# Patient Record
Sex: FEMALE | HISPANIC OR LATINO | URBAN - METROPOLITAN AREA
[De-identification: names, ages, dates, MRNs, and addresses within clinical notes are randomized per-mention and may not be internally consistent; named-entity substitution may affect disease eponyms.]

---

## 2019-04-17 NOTE — IMPRESSION/PLAN
Impression: Open angle with borderline findings, low risk, bilateral: H40.013. Plan: glaucoma suspect, no treatment at this time. 

RTC 2-4 months  glaucoma screen with medical optometrist

## 2019-04-17 NOTE — IMPRESSION/PLAN
Impression: Age-related nuclear cataract, bilateral: H25.13. Plan: The patient has a visually significant cataract in both eyes. After discussion with the patient and careful examination it has been determined that a cataract in both eyes is accounting for a significant amount of the patient's visual symptoms. Cataract surgery and the associated risks, benefits, alternatives, expectations, and recovery were discussed in detail with the patient. All questions were answered. The patient understands that there may be some limitation in visual potential given any pre-existing ocular disease. The patient desires to think about cataract surgery. 

If patient decides to have surgery OU RL 2
CE/IOL left eye then right eye, R/B/ A discussed

## 2019-04-17 NOTE — IMPRESSION/PLAN
Impression: Type 2 diabetes mellitus w/o complication: D06.5.  Plan: Advised to obtain good blood glucose control No diabetic retinopathy on today's exam

Recommend yearly diabetic exam with Corrinne Oliva or medial optometrist

## 2019-12-18 NOTE — IMPRESSION/PLAN
Impression: Other secondary cataract, bilateral: H26.493. Plan: Risks, benefits, alternatives, and expectations of YAG capsulotomy were discussed. The patient desires a YAG capsulotomy in both eyes. Schedule YAG in both eyes, right eye then left eye with 1-2 day post op.

## 2019-12-18 NOTE — IMPRESSION/PLAN
Impression: Open angle with borderline findings, low risk, bilateral: H40.013. Plan: No drop, continue to monitor RTC  4 months IOP check ( short exam)

## 2020-07-07 NOTE — IMPRESSION/PLAN
Impression: Other secondary cataract, bilateral: H26.493. Plan: Patient not symptomatic at this time.   

RTC 4-6 month YAG EVAL ( long exam) and DE

## 2021-01-05 NOTE — IMPRESSION/PLAN
Impression: Other secondary cataract, bilateral: H26.493. Plan: Patient would like to hold off on laser at this time RTC 1 year yag wale ( long exam)

## 2021-01-05 NOTE — IMPRESSION/PLAN
Impression: Type 2 diabetes mellitus w/o complication: L84.9. 
Patient may have early hypertensive disease Plan: No diabetic retinopathy on today's exam

Recommend yearly diabetic exam with Dr. Blaire De La Garza or Dr. Alvin France

## 2021-01-05 NOTE — IMPRESSION/PLAN
Impression: Bilateral central pterygium of eyes: H11.023. Plan: Discussed diagnosis in detail with patient. Advised patient of condition. No treatment is required at this time. Patient instructed to use artificial tears as needed. Will continue to observe condition and or symptoms. Patient to advices clinic of any changes prior to time of return.

## 2022-02-17 NOTE — IMPRESSION/PLAN
Impression: Other secondary cataract, right eye: H26.491. Plan: Patient happy w/current vision, will monitor with annual exams.

## 2022-02-17 NOTE — IMPRESSION/PLAN
Impression: Type 2 diabetes mellitus w/o complication: Y96.9. 
Patient may have early hypertensive disease Plan: No diabetic retinopathy on today's exam

Recommend yearly diabetic exam

## 2023-03-09 ENCOUNTER — OFFICE VISIT (OUTPATIENT)
Dept: URBAN - METROPOLITAN AREA CLINIC 48 | Facility: CLINIC | Age: 68
End: 2023-03-09
Payer: COMMERCIAL

## 2023-03-09 DIAGNOSIS — H26.491 OTHER SECONDARY CATARACT, RIGHT EYE: Primary | ICD-10-CM

## 2023-03-09 DIAGNOSIS — E11.3291 DIABETES MELLITUS TYPE 2 WITH MILD NON-PROLIFERATIVE RETINOPATHY WITHOUT MACULAR EDEMA, RIGHT EYE: ICD-10-CM

## 2023-03-09 PROCEDURE — 92014 COMPRE OPH EXAM EST PT 1/>: CPT | Performed by: OPHTHALMOLOGY

## 2023-03-09 ASSESSMENT — VISUAL ACUITY
OS: 20/30
OD: 20/30

## 2023-03-09 ASSESSMENT — INTRAOCULAR PRESSURE
OS: 18
OD: 15

## 2023-03-09 NOTE — IMPRESSION/PLAN
Impression: Other secondary cataract, right eye: H26.491. Plan: Not visually significant, monitor. Annual follow up.

## 2023-03-09 NOTE — IMPRESSION/PLAN
Impression: Diabetes mellitus Type 2 with mild non-proliferative retinopathy without macular edema, right eye: I64.1020. Plan: Diabetes type II: mild background diabetic retinopathy, no signs of neovascularization noted. No treatment necessary at this time. Patient was instructed to monitor vision for sudden changes and to call if visual changes noted. Discussed ocular and systemic benefits of blood sugar control. Annual dilated eye exam with Dr Michelle Mcnair.

## 2024-05-14 ENCOUNTER — OFFICE VISIT (OUTPATIENT)
Dept: URBAN - METROPOLITAN AREA CLINIC 48 | Facility: CLINIC | Age: 69
End: 2024-05-14
Payer: MEDICARE

## 2024-05-14 DIAGNOSIS — E11.9 TYPE 2 DIABETES MELLITUS W/O COMPLICATION: Primary | ICD-10-CM

## 2024-05-14 DIAGNOSIS — H43.813 VITREOUS DEGENERATION, BILATERAL: ICD-10-CM

## 2024-05-14 DIAGNOSIS — H11.013 AMYLOID PTERYGIUM OF EYE, BILATERAL: ICD-10-CM

## 2024-05-14 PROCEDURE — 99204 OFFICE O/P NEW MOD 45 MIN: CPT | Performed by: OPTOMETRIST

## 2024-05-14 PROCEDURE — 92134 CPTRZ OPH DX IMG PST SGM RTA: CPT | Performed by: OPTOMETRIST

## 2024-05-14 ASSESSMENT — INTRAOCULAR PRESSURE
OD: 14
OS: 14

## 2025-05-19 ENCOUNTER — OFFICE VISIT (OUTPATIENT)
Dept: URBAN - METROPOLITAN AREA CLINIC 48 | Facility: CLINIC | Age: 70
End: 2025-05-19
Payer: MEDICARE

## 2025-05-19 DIAGNOSIS — Z96.1 PRESENCE OF PSEUDOPHAKIA: ICD-10-CM

## 2025-05-19 DIAGNOSIS — E11.9 TYPE 2 DIABETES MELLITUS W/O COMPLICATION: Primary | ICD-10-CM

## 2025-05-19 DIAGNOSIS — H43.813 VITREOUS DEGENERATION, BILATERAL: ICD-10-CM

## 2025-05-19 PROCEDURE — 99214 OFFICE O/P EST MOD 30 MIN: CPT | Performed by: OPTOMETRIST

## 2025-05-19 PROCEDURE — 92134 CPTRZ OPH DX IMG PST SGM RTA: CPT | Performed by: OPTOMETRIST

## 2025-05-19 ASSESSMENT — INTRAOCULAR PRESSURE
OD: 10
OS: 12

## 2025-05-19 ASSESSMENT — KERATOMETRY
OS: 43.88
OD: 43.50